# Patient Record
(demographics unavailable — no encounter records)

---

## 2024-12-17 NOTE — REASON FOR VISIT
[Initial Evaluation] : an initial evaluation [Osteoporosis] : osteoporosis [Formal Caregiver] : formal caregiver

## 2024-12-24 NOTE — PHYSICAL EXAM
[Alert] : alert [Well Nourished] : well nourished [EOMI] : extra ocular movement intact [Normal Hearing] : hearing was normal [No Respiratory Distress] : no respiratory distress [Normal Rate] : heart rate was normal [No Tremors] : no tremors [Normal Affect] : the affect was normal [Normal Mood] : the mood was normal [Kyphosis] : kyphosis present

## 2024-12-26 NOTE — HISTORY OF PRESENT ILLNESS
[FreeTextEntry1] : Ms. Johns is presenting for Osteoporosis.  Call home number, ask for DAVID Guaman.   90 year old female with PMH of GERD, h/o blood clots on Eliquis.  Pt notes she has been on prolia by GYN but per recent bone density done (records not brought in), her GYN suggested that prolia may not be working and needs to be switched.   Home meds:  Fracture history: spinal fracture? before COVID due to carrying groceries.  Family history: No parental history of hip fracture Prior Treatment: Unknown but likely no.  Current treatment: Last prolia Oct 7th, 2024 (unknown when first dose was given).  Prior HRT: none  Allergies:  Bone History Menopause: Mid 50s  DEXA scan July 2021: T-scores of -1.0 at the lumbar spine, -2.9 at the femoral neck, 1.9 total hip DEXA scan June 2024: T-scores of -0.2 at the lumbar spine, -2.4 at the femoral neck, total hip. FRAX 17% major risk, 6.6% Hip risk.   Falls: No Height loss: Yes  Kidney stones: No Dental health: Regular appointments, no history of implants, no upcoming procedures planned. Next appt Jan 2025.  Exercise: walks up the stairs, walks around her apt, walks in supermarket.  Dairy intake: cottage cheese, milk on occasion, cheese and ice cream.  Calcium supplements:  Multivitamin:  Vitamin D supplements: Elevated Vit D level, weekly stopped and taking 1,000IU every 3 days.   Osteoporosis risk factors include: Postmenopausal status,  race, prior fracture, falls, height loss, small thin bones, tobacco use, malignancy, radiation treatment, excessive alcohol, anorexia, family history, vitamin D deficiency, long term corticosteroid use (>3 months), seizure medications (ie phenytoin), prolonged amenorrhea, eating disorders, malabsorption, hyperparathyroidism, hyperthyroidism. NEGATIVE EXCEPT: Postmenopausal status,  race, fracture, thin bones.

## 2024-12-26 NOTE — HISTORY OF PRESENT ILLNESS
[FreeTextEntry1] : Ms. Johns is presenting for Osteoporosis.  Call home number, ask for DAVID Guaamn.   90 year old female with PMH of GERD, h/o blood clots on Eliquis.  Pt notes she has been on prolia by GYN but per recent bone density done (records not brought in), her GYN suggested that prolia may not be working and needs to be switched.   Home meds:  Fracture history: spinal fracture? before COVID due to carrying groceries.  Family history: No parental history of hip fracture Prior Treatment: Unknown but likely no.  Current treatment: Last prolia Oct 7th, 2024 (unknown when first dose was given).  Prior HRT: none  Allergies:  Bone History Menopause: Mid 50s  DEXA scan July 2021: T-scores of -1.0 at the lumbar spine, -2.9 at the femoral neck, 1.9 total hip DEXA scan June 2024: T-scores of -0.2 at the lumbar spine, -2.4 at the femoral neck, total hip. FRAX 17% major risk, 6.6% Hip risk.   Falls: No Height loss: Yes  Kidney stones: No Dental health: Regular appointments, no history of implants, no upcoming procedures planned. Next appt Jan 2025.  Exercise: walks up the stairs, walks around her apt, walks in supermarket.  Dairy intake: cottage cheese, milk on occasion, cheese and ice cream.  Calcium supplements:  Multivitamin:  Vitamin D supplements: Elevated Vit D level, weekly stopped and taking 1,000IU every 3 days.   Osteoporosis risk factors include: Postmenopausal status,  race, prior fracture, falls, height loss, small thin bones, tobacco use, malignancy, radiation treatment, excessive alcohol, anorexia, family history, vitamin D deficiency, long term corticosteroid use (>3 months), seizure medications (ie phenytoin), prolonged amenorrhea, eating disorders, malabsorption, hyperparathyroidism, hyperthyroidism. NEGATIVE EXCEPT: Postmenopausal status,  race, fracture, thin bones.

## 2024-12-26 NOTE — ASSESSMENT
[Denosumab Therapy] : Risks  and benefits of denosumab therapy were discussed with the patient including eczema, cellulitis, osteonecrosis of the jaw and atypical femur fractures [Bisphosphonates] : The patient was instructed to take bisphosphonates on an empty stomach with a full glass of water,and wait at least 30 minutes before eating or lying down [FreeTextEntry1] : 90 year old female with PMH of GERD, h/o blood clots on Eliquis sis presenting for Osteoporosis.    1. Osteoporosis.  She has possible history of spinal fragility fracture but no records brought in and patient has poor memory recall. We need her most recent bone density which she states was done this year.  We discussed completion of a metabolic evaluation for secondary causes of bone loss.  We discussed the potential benefits and risks of the osteoanabolic and antiresorptive classes of pharmacologic osteoporosis therapy. We discussed the potential benefits and risks of antiresorptive osteoporosis therapy with denosumab or zoledronic acid at length, including but not limited to osteonecrosis of the jaw and atypical femoral fracture. We discussed that denosumab must be dosed every 6 months due to rebound increase in bone breakdown with abrupt discontinuation of therapy, with transition to bisphosphonate therapy prior to a "drug holiday."   I still recommend therapy with Prolia pending further information ie date of when prolia was initiated, her most reent bone density scan.  Metabolic evaluation for secondary causes of osteoporosis Calcium 1000 mg daily from diet and supplements (to be taken in divided doses as no more than 500-600 mg can be absorbed at one time); advised increased dietary and/or supplemental calcium Vitamin D supplementation up to 1000 intl units every other day.  Diet, weight bearing exercises and fall prevention discussed  Patient verbalized understanding of the above. All questions were answered to patient's satisfaction. Dispo: Patient will follow up in 4 weeks to go over above results.

## 2024-12-26 NOTE — HISTORY OF PRESENT ILLNESS
[FreeTextEntry1] : Ms. Johns is presenting for Osteoporosis.  Call home number, ask for ADVID Guaman.   90 year old female with PMH of GERD, h/o blood clots on Eliquis.  Pt notes she has been on prolia by GYN but per recent bone density done (records not brought in), her GYN suggested that prolia may not be working and needs to be switched.   Home meds:  Fracture history: spinal fracture? before COVID due to carrying groceries.  Family history: No parental history of hip fracture Prior Treatment: Unknown but likely no.  Current treatment: Last prolia Oct 7th, 2024 (unknown when first dose was given).  Prior HRT: none  Allergies:  Bone History Menopause: Mid 50s  DEXA scan July 2021: T-scores of -1.0 at the lumbar spine, -2.9 at the femoral neck, 1.9 total hip DEXA scan June 2024: T-scores of -0.2 at the lumbar spine, -2.4 at the femoral neck, total hip. FRAX 17% major risk, 6.6% Hip risk.   Falls: No Height loss: Yes  Kidney stones: No Dental health: Regular appointments, no history of implants, no upcoming procedures planned. Next appt Jan 2025.  Exercise: walks up the stairs, walks around her apt, walks in supermarket.  Dairy intake: cottage cheese, milk on occasion, cheese and ice cream.  Calcium supplements:  Multivitamin:  Vitamin D supplements: Elevated Vit D level, weekly stopped and taking 1,000IU every 3 days.   Osteoporosis risk factors include: Postmenopausal status,  race, prior fracture, falls, height loss, small thin bones, tobacco use, malignancy, radiation treatment, excessive alcohol, anorexia, family history, vitamin D deficiency, long term corticosteroid use (>3 months), seizure medications (ie phenytoin), prolonged amenorrhea, eating disorders, malabsorption, hyperparathyroidism, hyperthyroidism. NEGATIVE EXCEPT: Postmenopausal status,  race, fracture, thin bones.
